# Patient Record
Sex: MALE
[De-identification: names, ages, dates, MRNs, and addresses within clinical notes are randomized per-mention and may not be internally consistent; named-entity substitution may affect disease eponyms.]

---

## 2022-11-02 ENCOUNTER — APPOINTMENT (OUTPATIENT)
Dept: NEUROLOGY | Facility: CLINIC | Age: 87
End: 2022-11-02

## 2022-11-02 DIAGNOSIS — R41.89 OTHER SYMPTOMS AND SIGNS INVOLVING COGNITIVE FUNCTIONS AND AWARENESS: ICD-10-CM

## 2022-11-02 DIAGNOSIS — R25.8 OTHER ABNORMAL INVOLUNTARY MOVEMENTS: ICD-10-CM

## 2022-11-02 PROBLEM — Z00.00 ENCOUNTER FOR PREVENTIVE HEALTH EXAMINATION: Status: ACTIVE | Noted: 2022-11-02

## 2022-11-02 PROCEDURE — 99205 OFFICE O/P NEW HI 60 MIN: CPT | Mod: 95

## 2022-11-02 RX ORDER — LOSARTAN POTASSIUM 100 MG/1
100 TABLET, FILM COATED ORAL
Qty: 90 | Refills: 0 | Status: ACTIVE | COMMUNITY
Start: 2022-07-07

## 2022-11-02 RX ORDER — CARBIDOPA 25 MG/1
25 TABLET ORAL
Qty: 90 | Refills: 3 | Status: ACTIVE | COMMUNITY
Start: 2022-11-02 | End: 1900-01-01

## 2022-11-02 RX ORDER — BUPROPION HYDROCHLORIDE 300 MG/1
300 TABLET, EXTENDED RELEASE ORAL
Qty: 30 | Refills: 0 | Status: ACTIVE | COMMUNITY
Start: 2022-05-19

## 2022-11-02 RX ORDER — CARBIDOPA AND LEVODOPA 25; 100 MG/1; MG/1
25-100 TABLET, EXTENDED RELEASE ORAL
Qty: 270 | Refills: 3 | Status: ACTIVE | COMMUNITY
Start: 2022-11-02 | End: 1900-01-01

## 2022-11-02 RX ORDER — SERTRALINE HYDROCHLORIDE 50 MG/1
50 TABLET, FILM COATED ORAL
Qty: 30 | Refills: 0 | Status: ACTIVE | COMMUNITY
Start: 2022-10-12

## 2022-11-02 RX ORDER — ATORVASTATIN CALCIUM 20 MG/1
20 TABLET, FILM COATED ORAL
Qty: 90 | Refills: 0 | Status: ACTIVE | COMMUNITY
Start: 2022-07-07

## 2022-11-02 RX ORDER — PRASUGREL 10 MG/1
10 TABLET, FILM COATED ORAL
Qty: 30 | Refills: 0 | Status: ACTIVE | COMMUNITY
Start: 2022-10-13

## 2022-11-02 RX ORDER — TAMSULOSIN HYDROCHLORIDE 0.4 MG/1
0.4 CAPSULE ORAL
Qty: 30 | Refills: 0 | Status: ACTIVE | COMMUNITY
Start: 2018-06-13

## 2022-11-02 NOTE — DISCUSSION/SUMMARY
[FreeTextEntry1] : Nate Christensen is an 88 year old M with a PMHx PAD s/p stents in the both legs, CAD s/p stent, HTN, and Depression. He presents for initial evaluation in the Movement Disorders at [facility] via telehealth. Presents for a second opinion for Parkinson's disease. He is accompanied by his daughter.\par \par The patient's history and physical examination are consistent with Parkinsonism. 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 His gait is his main concern. He also has significant bradyphrenia. \par \par Plan:\par - Switch from Sinemet IR to ER 25-100mg 1 tab TID\par - Extra Carbidopa 25mg if needed for nausea with each dose of Sinemet\par - Will send instructions for Orthostatic blood pressure monitoring\par - Increase hydration, compression stockings\par - Bowel regimen titrated to 1 bowel movement daily\par - Encouraged to increase exercise and physical activity and maintain an active social and intellectual life.\par - Discussed the Mediterranean diet, antiinflammatory/antioxidant foods, probiotics, fiber, caffeine, and vitamins\par - Referrals for PT and Speech therapy\par - Will place patient and his wife on [facility] list\par - Continue Melatonin nightly\par \par RTC 8 weeks\par \par All questions were answered to their satisfaction. I spent 60 minutes on this encounter.
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
## 2022-11-02 NOTE — PHYSICAL EXAM
[Person] : oriented to person [Place] : oriented to place [Time] : oriented to time [Comprehension] : comprehension intact [Cranial Nerves Optic (II)] : visual acuity intact bilaterally,  visual fields full to confrontation, pupils equal round and reactive to light [Cranial Nerves Oculomotor (III)] : extraocular motion intact [Cranial Nerves Trigeminal (V)] : facial sensation intact symmetrically [Cranial Nerves Facial (VII)] : face symmetrical [Cranial Nerves Vestibulocochlear (VIII)] : hearing was intact bilaterally [Cranial Nerves Glossopharyngeal (IX)] : tongue and palate midline [Cranial Nerves Accessory (XI - Cranial And Spinal)] : head turning and shoulder shrug symmetric [Cranial Nerves Hypoglossal (XII)] : there was no tongue deviation with protrusion [Involuntary Movements] : no involuntary movements were seen [Concentration Intact] : a decrease in concentrating ability was observed [Paresis Pronator Drift Right-Sided] : no pronator drift on the right [Paresis Pronator Drift Left-Sided] : no pronator drift on the left [FreeTextEntry1] : Face is moderately masked. Voice is moderately hypophonic. Moderate-severe bradyphrenia.\par There were no resting, postural or action tremors.\par There was moderate bradykinesia bilaterally, left more than right, with finger tapping, rapid alternating and sequential movements. \par There was mild rigidity based on hand shaking.\par Mildly impaired leg agility bilaterally, left more than right.\par Unable to stand without pushing up. Posture is mildly stooped.\par Gait is with shortened stride length and speed, mildly decreased arm swing bilaterally. Multistep turns. No particular shuffling or freezing of gait noted.\par \par Unable to adequately assess postural reflexes via telehealth.

## 2022-12-14 ENCOUNTER — APPOINTMENT (OUTPATIENT)
Dept: NEUROLOGY | Facility: CLINIC | Age: 87
End: 2022-12-14

## 2022-12-21 ENCOUNTER — APPOINTMENT (OUTPATIENT)
Dept: NEUROLOGY | Facility: CLINIC | Age: 87
End: 2022-12-21
Payer: MEDICARE

## 2022-12-21 PROCEDURE — 99205 OFFICE O/P NEW HI 60 MIN: CPT

## 2022-12-21 PROCEDURE — 99215 OFFICE O/P EST HI 40 MIN: CPT

## 2023-01-03 NOTE — DISCUSSION/SUMMARY
[FreeTextEntry1] : Nate Christensen is an 88 year old M with a PMHx PAD s/p stents in the both legs, CAD s/p stent, HTN, and Depression, He presents for follow up in the Movement Disorders at [facility]. \par Presented initially for a second opinion for Parkinson's disease. He is accompanied by Taylor, his aid. \par \par The patient's history and physical examination are consistent with Parkinsonism. 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 His gait is his main concern. He also has significant bradyphrenia. He does appear to have responded well to Sinemet, without side effects.\par \par Plan:\par - Continue Sinemet ER 25-100mg 1 tab TID\par - Extra Carbidopa 25mg if needed for nausea with each dose of Sinemet\par - Will send instructions for Orthostatic blood pressure monitoring\par - Increase hydration, compression stockings\par - Bowel regimen titrated to 1 bowel movement daily\par - Encouraged to increase exercise and physical activity and maintain an active social and intellectual life.\par - Discussed the Mediterranean diet, antiinflammatory/antioxidant foods, probiotics, fiber, caffeine, and vitamins\par - Continue Melatonin nightly\par \par RTC at next available \par \par All questions were answered to their satisfaction. I spent 40 minutes on this encounter.

## 2023-01-03 NOTE — HISTORY OF PRESENT ILLNESS
[FreeTextEntry1] : Nate Christensen is an 88 year old M with a PMHx PAD s/p stents in the both legs, CAD s/p stent, HTN, Depression\par He presents for follow up in the Movement Disorders at [facility]. \par Presented initially for a second opinion for Parkinson's disease. He is accompanied by Taylor, his aid. \par \par Interval history:\par Since the last visit, he has been okay generally, but has trouble writing, he has coordination mostly when writing script. It is smaller and he tries to make it larger which does help. When he is walking he is dragging his left foot. He is in OT right now and doing [facility] therapy. \par Even with the ER Sinemet we was getting nausea. He tried taking the extra carbidopa which seems to help. He is taking the medication after a help and that makes a difference.\par He has constipation. Drinking smooth move tea and sometimes he has to take a suppository. He has a bowel movement once every couple of days. He takes Miralax daily. \par No trouble sleeping. He is snoring a lot. \par He has dizziness and lightheadedness when standing. He is not drinking a lot of water. He has not been monitoring the blood pressure. He is not on a low salt diet. \par No hallucinations. \par \par He saw Dr. Aleman. His impression was atypical parkinsonism. He reviewed a CT of the head that showed mini strokes. He felt it was the vascular type. He is taking Plavix daily. \par \par Current meds:\par Sinemet ER 25-100mg 1/2 tab TID at 10a-2p-6p\par Plavix\par Atorvastatin \par Losartan\par \par Initial history:\par A few days ago he had a neurological evaluation. There was a question of NPH being a cause for his gait disorder, and the Neurologist did not think it was related to his walking disorder.\par His assessment discussed orthostasis, freezing of gait and imbalance. He is also having trouble with his speech, mostly articulation. He has a history of microvascular disease and spinal stenosis that may be contributing to his gait issues. They recommended PT and a trial of Sinemet 25-100mg 1 tab TID. He started Sinemet a couple of days ago and feels dizziness and nausea despite taking it with meals. It has been unpleasant. He cut it back to 1/2 tab TID, has not seen a clinical benefit, but no dizziness/nausea.\par His thinking is muddy. He had a fall and hit his head. This was a week ago. He was seen in the ED and the CT head was negative for bleeding, per patient report. However, he had no recall the event itself. He remembers falling, but not hitting his head. There was a lump there and someone pointed it out to him. For a couple of days he just wanted to sleep. He had worsening of his balance at that time. \par He has tightening pain in the legs while walking, mostly below the knee. \par \par He uses a bar on the bed to assist with turning. He has occasional trouble with buttons and zippers. No trouble cutting food. His movements are slower and his walking has slowed down. He is taking smaller steps, noted a couple of weeks ago, maybe longer. No tremors, however his daughter has noticed a tremor while he was eating this morning, both hands. \par His daughter notes that his walking is much better since starting Levodopa.\par \par No changes in sense of smell.\par His handwriting has changed. It is smaller and more difficult to control. \par His daughter notes changes in his facial expression. Movements in the upper face have changed. \par No swallowing issues. \par His voice is softer than before.\par There is leaning over and slouch.\par He has constipation. He has a bowel movement every other day. He does not drink enough water.\par He has trouble with urination, he has urgency. He has some leaking.\par No history of hallucinations.\par Memory is good.\par Mood is depressed. He is on Wellbutrin and Sertraline. He fell when his wife lost her balance, and that is when he hit his head.\par He has some trouble sleeping. He wakes up in the middle of the night and have trouble falling asleep again after using the bathroom. He is not talking in sleep or acting out dreams. \par He has dizziness/lightheadedness when standing. He has orthostatic hypotension. He is on Losartan, has not been decreased. At least a year ago. \par No back pain\par Occasionally feels like his feet are stuck to the floor. He feels that his feet are like cement posts.\par \par Family history: unremarkable\par Social history: lives with his wife who is in a rehab at the moment, he is exercising moderately and has been curtailed because of the muscular pain in the legs. Patient's wife has Parkinson's disease.\par

## 2023-01-03 NOTE — PHYSICAL EXAM
[Person] : oriented to person [Place] : oriented to place [Time] : oriented to time [Comprehension] : comprehension intact [Cranial Nerves Optic (II)] : visual acuity intact bilaterally,  visual fields full to confrontation, pupils equal round and reactive to light [Cranial Nerves Oculomotor (III)] : extraocular motion intact [Cranial Nerves Trigeminal (V)] : facial sensation intact symmetrically [Cranial Nerves Facial (VII)] : face symmetrical [Cranial Nerves Vestibulocochlear (VIII)] : hearing was intact bilaterally [Cranial Nerves Glossopharyngeal (IX)] : tongue and palate midline [Cranial Nerves Accessory (XI - Cranial And Spinal)] : head turning and shoulder shrug symmetric [Cranial Nerves Hypoglossal (XII)] : there was no tongue deviation with protrusion [Involuntary Movements] : no involuntary movements were seen [Concentration Intact] : a decrease in concentrating ability was observed [Paresis Pronator Drift Right-Sided] : no pronator drift on the right [Paresis Pronator Drift Left-Sided] : no pronator drift on the left [FreeTextEntry1] : Face is mildly masked. Voice is mildly hypophonic. Mild bradyphrenia.\par There were no resting, postural or action tremors.\par There was mild-moderate bradykinesia bilaterally, left more than right, with finger tapping, rapid alternating and sequential movements. \par There was mild rigidity based on hand shaking.\par Mildly impaired leg agility bilaterally, left more than right.\par Able to stand without arms. Posture is mildly stooped.\par Gait is with shortened stride length and speed, mildly decreased arm swing bilaterally. Multistep turns. No particular shuffling or freezing of gait noted.\par Postural reflexes are intact.

## 2023-01-26 ENCOUNTER — NON-APPOINTMENT (OUTPATIENT)
Age: 88
End: 2023-01-26

## 2023-01-26 RX ORDER — CARBIDOPA AND LEVODOPA 25; 100 MG/1; MG/1
25-100 TABLET ORAL
Qty: 90 | Refills: 0 | Status: DISCONTINUED | COMMUNITY
Start: 2022-10-27 | End: 2023-01-26

## 2023-02-03 ENCOUNTER — NON-APPOINTMENT (OUTPATIENT)
Age: 88
End: 2023-02-03

## 2023-05-17 ENCOUNTER — APPOINTMENT (OUTPATIENT)
Dept: NEUROLOGY | Facility: CLINIC | Age: 88
End: 2023-05-17

## 2023-05-22 ENCOUNTER — OFFICE (OUTPATIENT)
Dept: URBAN - METROPOLITAN AREA CLINIC 29 | Facility: CLINIC | Age: 88
Setting detail: OPHTHALMOLOGY
End: 2023-05-22
Payer: MEDICARE

## 2023-05-22 DIAGNOSIS — H01.005: ICD-10-CM

## 2023-05-22 DIAGNOSIS — Z96.1: ICD-10-CM

## 2023-05-22 DIAGNOSIS — H01.001: ICD-10-CM

## 2023-05-22 DIAGNOSIS — H02.834: ICD-10-CM

## 2023-05-22 DIAGNOSIS — H01.002: ICD-10-CM

## 2023-05-22 DIAGNOSIS — H25.13: ICD-10-CM

## 2023-05-22 DIAGNOSIS — D31.31: ICD-10-CM

## 2023-05-22 DIAGNOSIS — H40.051: ICD-10-CM

## 2023-05-22 DIAGNOSIS — H25.12: ICD-10-CM

## 2023-05-22 DIAGNOSIS — H01.004: ICD-10-CM

## 2023-05-22 DIAGNOSIS — H02.831: ICD-10-CM

## 2023-05-22 DIAGNOSIS — H35.3221: ICD-10-CM

## 2023-05-22 PROBLEM — H52.202 ASTIGMATISM, UNSPECIFIED; LEFT EYE: Status: ACTIVE | Noted: 2023-05-22

## 2023-05-22 PROCEDURE — 99214 OFFICE O/P EST MOD 30 MIN: CPT | Performed by: OPHTHALMOLOGY

## 2023-05-22 PROCEDURE — 92134 CPTRZ OPH DX IMG PST SGM RTA: CPT | Performed by: OPHTHALMOLOGY

## 2023-05-22 ASSESSMENT — REFRACTION_AUTOREFRACTION
OS_AXIS: 175
OD_CYLINDER: +1.00
OD_SPHERE: -0.50
OS_CYLINDER: +2.75
OS_SPHERE: -1.50
OD_AXIS: 115

## 2023-05-22 ASSESSMENT — SPHEQUIV_DERIVED
OD_SPHEQUIV: 2.625
OD_SPHEQUIV: 0
OS_SPHEQUIV: 2.75
OS_SPHEQUIV: -0.125
OS_SPHEQUIV: -0.125
OD_SPHEQUIV: -0.25

## 2023-05-22 ASSESSMENT — LID EXAM ASSESSMENTS
OD_DERMATOCHALASIS: 2+
OS_DERMATOCHALASIS: 2+
OS_BLEPHARITIS: LLL LUL 3+
OD_BLEPHARITIS: RLL RUL 3+

## 2023-05-22 ASSESSMENT — REFRACTION_CURRENTRX
OS_OVR_VA: 20/
OD_SPHERE: +1.50
OD_OVR_VA: 20/
OS_SPHERE: +1.50

## 2023-05-22 ASSESSMENT — LID POSITION - DERMATOCHALASIS
OS_DERMATOCHALASIS: LUL 2+
OD_DERMATOCHALASIS: RUL 2+

## 2023-05-22 ASSESSMENT — REFRACTION_MANIFEST
OD_SPHERE: +2.00
OD_CYLINDER: +1.25
OS_SPHERE: +2.00
OS_CYLINDER: +1.75
OD_CYLINDER: +1.50
OS_SPHERE: -1.00
OS_AXIS: 10
OS_AXIS: 10
OD_VA1: 20/20-1
OD_AXIS: 115
OD_AXIS: 115
OS_CYLINDER: +1.50
OS_VA1: 20/50-2
OD_ADD: +3.00
OS_ADD: +3.00
OD_SPHERE: -1.00

## 2023-05-22 ASSESSMENT — CONFRONTATIONAL VISUAL FIELD TEST (CVF)
OS_FINDINGS: FULL
OD_FINDINGS: FULL

## 2023-05-22 ASSESSMENT — VISUAL ACUITY
OD_BCVA: CF 3FT
OS_BCVA: 20/25

## 2023-07-26 ENCOUNTER — AMBULATORY SURGERY CENTER (OUTPATIENT)
Dept: URBAN - METROPOLITAN AREA SURGERY 17 | Facility: SURGERY | Age: 88
Setting detail: OPHTHALMOLOGY
End: 2023-07-26
Payer: MEDICARE

## 2023-07-26 DIAGNOSIS — H52.212: ICD-10-CM

## 2023-07-26 DIAGNOSIS — H25.12: ICD-10-CM

## 2023-07-26 PROCEDURE — 66984 XCAPSL CTRC RMVL W/O ECP: CPT | Performed by: OPHTHALMOLOGY

## 2023-07-26 PROCEDURE — FEMTO FEMTOSECOND LASER: Performed by: OPHTHALMOLOGY

## 2023-07-26 PROCEDURE — A9270 NON-COVERED ITEM OR SERVICE: HCPCS | Performed by: OPHTHALMOLOGY

## 2023-07-27 ENCOUNTER — RX ONLY (RX ONLY)
Age: 88
End: 2023-07-27

## 2023-07-27 ENCOUNTER — OFFICE (OUTPATIENT)
Dept: URBAN - METROPOLITAN AREA CLINIC 29 | Facility: CLINIC | Age: 88
Setting detail: OPHTHALMOLOGY
End: 2023-07-27
Payer: MEDICARE

## 2023-07-27 DIAGNOSIS — Z96.1: ICD-10-CM

## 2023-07-27 PROCEDURE — 99024 POSTOP FOLLOW-UP VISIT: CPT | Performed by: OPHTHALMOLOGY

## 2023-07-27 ASSESSMENT — REFRACTION_MANIFEST
OS_ADD: +3.00
OD_ADD: +3.00
OS_SPHERE: -1.00
OS_CYLINDER: +1.75
OD_AXIS: 115
OS_AXIS: 10
OD_SPHERE: +2.00
OS_SPHERE: +2.00
OD_CYLINDER: +1.25
OD_VA1: 20/20-1
OS_CYLINDER: +1.50
OD_CYLINDER: +1.50
OD_SPHERE: -1.00
OD_AXIS: 115
OS_AXIS: 10
OS_VA1: 20/50-2

## 2023-07-27 ASSESSMENT — LID EXAM ASSESSMENTS
OS_BLEPHARITIS: LLL LUL 3+
OD_BLEPHARITIS: RLL RUL 3+
OS_DERMATOCHALASIS: 2+
OD_DERMATOCHALASIS: 2+

## 2023-07-27 ASSESSMENT — REFRACTION_AUTOREFRACTION
OS_AXIS: 175
OD_SPHERE: -0.50
OD_CYLINDER: +1.00
OD_AXIS: 115
OS_SPHERE: -1.50
OS_CYLINDER: +2.75

## 2023-07-27 ASSESSMENT — REFRACTION_CURRENTRX
OD_SPHERE: +1.50
OS_OVR_VA: 20/
OS_SPHERE: +1.50
OD_OVR_VA: 20/

## 2023-07-27 ASSESSMENT — CONFRONTATIONAL VISUAL FIELD TEST (CVF)
OS_FINDINGS: FULL
OD_FINDINGS: FULL

## 2023-07-27 ASSESSMENT — LID POSITION - DERMATOCHALASIS
OD_DERMATOCHALASIS: RUL 2+
OS_DERMATOCHALASIS: LUL 2+

## 2023-07-27 ASSESSMENT — SPHEQUIV_DERIVED
OD_SPHEQUIV: -0.25
OS_SPHEQUIV: -0.125
OD_SPHEQUIV: 2.625
OD_SPHEQUIV: 0
OS_SPHEQUIV: 2.75
OS_SPHEQUIV: -0.125

## 2023-07-27 ASSESSMENT — VISUAL ACUITY
OD_BCVA: 20/150
OS_BCVA: 20/25

## 2023-07-27 ASSESSMENT — CORNEAL EDEMA - FOLDS/STRIAE: OS_FOLDSSTRIAE: 1+ 2+

## 2023-07-27 ASSESSMENT — TONOMETRY: OD_IOP_MMHG: 16

## 2023-08-08 DIAGNOSIS — K14.6 GLOSSODYNIA: ICD-10-CM

## 2023-08-08 RX ORDER — GABAPENTIN 100 MG/1
100 CAPSULE ORAL
Qty: 60 | Refills: 3 | Status: ACTIVE | COMMUNITY
Start: 2023-08-08 | End: 1900-01-01

## 2023-09-07 ENCOUNTER — OFFICE (OUTPATIENT)
Dept: URBAN - METROPOLITAN AREA CLINIC 29 | Facility: CLINIC | Age: 88
Setting detail: OPHTHALMOLOGY
End: 2023-09-07
Payer: MEDICARE

## 2023-09-07 DIAGNOSIS — H01.002: ICD-10-CM

## 2023-09-07 DIAGNOSIS — H01.005: ICD-10-CM

## 2023-09-07 DIAGNOSIS — H01.001: ICD-10-CM

## 2023-09-07 DIAGNOSIS — Z96.1: ICD-10-CM

## 2023-09-07 DIAGNOSIS — H01.004: ICD-10-CM

## 2023-09-07 PROCEDURE — 99024 POSTOP FOLLOW-UP VISIT: CPT | Performed by: OPHTHALMOLOGY

## 2023-09-07 ASSESSMENT — REFRACTION_CURRENTRX
OD_SPHERE: +1.50
OD_OVR_VA: 20/
OS_OVR_VA: 20/
OS_SPHERE: +1.50

## 2023-09-07 ASSESSMENT — SPHEQUIV_DERIVED
OS_SPHEQUIV: -0.125
OD_SPHEQUIV: 0
OD_SPHEQUIV: -0.25
OD_SPHEQUIV: 2.625
OS_SPHEQUIV: -0.125
OS_SPHEQUIV: 2.75

## 2023-09-07 ASSESSMENT — VISUAL ACUITY
OD_BCVA: 20/40
OS_BCVA: 20/25-2

## 2023-09-07 ASSESSMENT — REFRACTION_AUTOREFRACTION
OD_AXIS: 115
OS_AXIS: 175
OD_SPHERE: -0.50
OS_SPHERE: -1.50
OD_CYLINDER: +1.00
OS_CYLINDER: +2.75

## 2023-09-07 ASSESSMENT — CORNEAL EDEMA - FOLDS/STRIAE: OS_FOLDSSTRIAE: 1+ 2+

## 2023-09-07 ASSESSMENT — REFRACTION_MANIFEST
OS_AXIS: 10
OS_SPHERE: -1.00
OS_AXIS: 10
OS_VA1: 20/50-2
OD_AXIS: 115
OD_AXIS: 115
OD_VA1: 20/20-1
OD_SPHERE: -1.00
OS_ADD: +3.00
OD_SPHERE: +2.00
OS_SPHERE: +2.00
OS_CYLINDER: +1.50
OS_CYLINDER: +1.75
OD_CYLINDER: +1.25
OD_ADD: +3.00
OD_CYLINDER: +1.50

## 2023-09-07 ASSESSMENT — LID POSITION - DERMATOCHALASIS
OD_DERMATOCHALASIS: RUL 2+
OS_DERMATOCHALASIS: LUL 2+

## 2023-09-07 ASSESSMENT — LID EXAM ASSESSMENTS
OD_BLEPHARITIS: RLL RUL 3+
OS_DERMATOCHALASIS: 2+
OS_BLEPHARITIS: LLL LUL 3+
OD_DERMATOCHALASIS: 2+

## 2023-09-07 ASSESSMENT — CONFRONTATIONAL VISUAL FIELD TEST (CVF)
OD_FINDINGS: FULL
OS_FINDINGS: FULL

## 2023-09-07 ASSESSMENT — TONOMETRY: OD_IOP_MMHG: 14

## 2023-12-11 ENCOUNTER — OFFICE (OUTPATIENT)
Dept: URBAN - METROPOLITAN AREA CLINIC 29 | Facility: CLINIC | Age: 88
Setting detail: OPHTHALMOLOGY
End: 2023-12-11
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
## 2023-12-11 PROCEDURE — NO SHOW FE NO SHOW FEE: Performed by: OPHTHALMOLOGY

## 2023-12-21 ENCOUNTER — TRANSCRIPTION ENCOUNTER (OUTPATIENT)
Age: 88
End: 2023-12-21

## 2024-01-31 ENCOUNTER — APPOINTMENT (OUTPATIENT)
Dept: NEUROLOGY | Facility: CLINIC | Age: 89
End: 2024-01-31
Payer: MEDICARE

## 2024-01-31 VITALS — DIASTOLIC BLOOD PRESSURE: 83 MMHG | SYSTOLIC BLOOD PRESSURE: 150 MMHG | OXYGEN SATURATION: 96 % | HEART RATE: 84 BPM

## 2024-01-31 DIAGNOSIS — Z74.09 OTHER REDUCED MOBILITY: ICD-10-CM

## 2024-01-31 DIAGNOSIS — G20.C PARKINSONISM, UNSPECIFIED: ICD-10-CM

## 2024-01-31 DIAGNOSIS — I95.1 ORTHOSTATIC HYPOTENSION: ICD-10-CM

## 2024-01-31 PROCEDURE — 99215 OFFICE O/P EST HI 40 MIN: CPT

## 2024-02-05 PROBLEM — I95.1 ORTHOSTATIC HYPOTENSION: Status: ACTIVE | Noted: 2022-11-02

## 2024-02-05 PROBLEM — Z74.09 IMPAIRED FUNCTIONAL MOBILITY, BALANCE, GAIT, AND ENDURANCE: Status: ACTIVE | Noted: 2022-11-02

## 2024-02-05 PROBLEM — G20.C PARKINSONISM: Status: ACTIVE | Noted: 2022-11-02
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
## 2024-02-05 NOTE — DISCUSSION/SUMMARY
[FreeTextEntry1] : Nate Christensen is an 89 year old M with a PMHx PAD s/p stents in the both legs, CAD s/p stent, HTN, and Depression. He presents for follow up in the Movement Disorders at [facility] for Parkinsonism.  He is accompanied by his aid, his daughter at bedside, and daughter over the phone.  The patient's history and physical examination are consistent with Parkinsonism. 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 His gait is currently stable despite his prior fall and has progressed well in therapy. He has mild bradyphrenia. He does appear to have responded well to Sinemet, without side effects. There is concern that his OH is currently active and may be contributing to his cognitive slowing.  Plan: - Continue Sinemet ER 25-100mg 1 tab TID every 4-5 hours - Monitor OH vitals for two weeks to trend. If OH is not present, then adjusting Sinemet ER can be pursued. - Increase hydration, compression stockings - Bowel regimen titrated to 1 bowel movement daily - Encouraged to increase exercise and physical activity and maintain an active social and intellectual life. - Discussed the Mediterranean diet, antiinflammatory/antioxidant foods, probiotics, fiber, caffeine, and vitamins  Patient will follow up with Movement Disorders.  All questions were answered to their satisfaction. I spent 40 minutes on this encounter.